# Patient Record
Sex: MALE | Race: WHITE
[De-identification: names, ages, dates, MRNs, and addresses within clinical notes are randomized per-mention and may not be internally consistent; named-entity substitution may affect disease eponyms.]

---

## 2020-11-17 ENCOUNTER — HOSPITAL ENCOUNTER (INPATIENT)
Dept: HOSPITAL 75 - ER FS | Age: 60
LOS: 1 days | Discharge: HOME | DRG: 65 | End: 2020-11-18
Attending: INTERNAL MEDICINE | Admitting: INTERNAL MEDICINE
Payer: SELF-PAY

## 2020-11-17 VITALS — SYSTOLIC BLOOD PRESSURE: 157 MMHG | DIASTOLIC BLOOD PRESSURE: 85 MMHG

## 2020-11-17 VITALS — BODY MASS INDEX: 28.6 KG/M2 | HEIGHT: 73.98 IN | WEIGHT: 222.89 LBS

## 2020-11-17 VITALS — DIASTOLIC BLOOD PRESSURE: 90 MMHG | SYSTOLIC BLOOD PRESSURE: 173 MMHG

## 2020-11-17 DIAGNOSIS — Z98.1: ICD-10-CM

## 2020-11-17 DIAGNOSIS — G82.20: ICD-10-CM

## 2020-11-17 DIAGNOSIS — Z23: ICD-10-CM

## 2020-11-17 DIAGNOSIS — G62.9: ICD-10-CM

## 2020-11-17 DIAGNOSIS — F17.210: ICD-10-CM

## 2020-11-17 DIAGNOSIS — I63.9: Primary | ICD-10-CM

## 2020-11-17 DIAGNOSIS — R47.01: ICD-10-CM

## 2020-11-17 LAB
ALBUMIN SERPL-MCNC: 4.6 GM/DL (ref 3.2–4.5)
ALP SERPL-CCNC: 81 U/L (ref 40–136)
ALT SERPL-CCNC: 27 U/L (ref 0–55)
BASOPHILS # BLD AUTO: 0 10^3/UL (ref 0–0.1)
BASOPHILS NFR BLD AUTO: 0 % (ref 0–10)
BILIRUB SERPL-MCNC: 0.7 MG/DL (ref 0.1–1)
BUN/CREAT SERPL: 11
CALCIUM SERPL-MCNC: 9.3 MG/DL (ref 8.5–10.1)
CHLORIDE SERPL-SCNC: 106 MMOL/L (ref 98–107)
CO2 SERPL-SCNC: 20 MMOL/L (ref 21–32)
CREAT SERPL-MCNC: 1.02 MG/DL (ref 0.6–1.3)
EOSINOPHIL # BLD AUTO: 0.1 10^3/UL (ref 0–0.3)
EOSINOPHIL NFR BLD AUTO: 1 % (ref 0–10)
GFR SERPLBLD BASED ON 1.73 SQ M-ARVRAT: > 60 ML/MIN
GLUCOSE SERPL-MCNC: 112 MG/DL (ref 70–105)
HCT VFR BLD CALC: 48 % (ref 40–54)
HGB BLD-MCNC: 16.9 G/DL (ref 13.3–17.7)
LYMPHOCYTES # BLD AUTO: 2.8 X 10^3 (ref 1–4)
LYMPHOCYTES NFR BLD AUTO: 25 % (ref 12–44)
MAGNESIUM SERPL-MCNC: 1.7 MG/DL (ref 1.6–2.4)
MANUAL DIFFERENTIAL PERFORMED BLD QL: NO
MCH RBC QN AUTO: 32 PG (ref 25–34)
MCHC RBC AUTO-ENTMCNC: 35 G/DL (ref 32–36)
MCV RBC AUTO: 92 FL (ref 80–99)
MONOCYTES # BLD AUTO: 1.1 X 10^3 (ref 0–1)
MONOCYTES NFR BLD AUTO: 10 % (ref 0–12)
NEUTROPHILS # BLD AUTO: 7.4 X 10^3 (ref 1.8–7.8)
NEUTROPHILS NFR BLD AUTO: 65 % (ref 42–75)
PLATELET # BLD: 160 10^3/UL (ref 130–400)
PMV BLD AUTO: 10.8 FL (ref 7.4–10.4)
POTASSIUM SERPL-SCNC: 3.8 MMOL/L (ref 3.6–5)
PROT SERPL-MCNC: 7.4 GM/DL (ref 6.4–8.2)
SODIUM SERPL-SCNC: 138 MMOL/L (ref 135–145)
WBC # BLD AUTO: 11.5 10^3/UL (ref 4.3–11)

## 2020-11-17 PROCEDURE — 72125 CT NECK SPINE W/O DYE: CPT

## 2020-11-17 PROCEDURE — 84484 ASSAY OF TROPONIN QUANT: CPT

## 2020-11-17 PROCEDURE — 80053 COMPREHEN METABOLIC PANEL: CPT

## 2020-11-17 PROCEDURE — 36415 COLL VENOUS BLD VENIPUNCTURE: CPT

## 2020-11-17 PROCEDURE — 72131 CT LUMBAR SPINE W/O DYE: CPT

## 2020-11-17 PROCEDURE — 90686 IIV4 VACC NO PRSV 0.5 ML IM: CPT

## 2020-11-17 PROCEDURE — 70450 CT HEAD/BRAIN W/O DYE: CPT

## 2020-11-17 PROCEDURE — 85379 FIBRIN DEGRADATION QUANT: CPT

## 2020-11-17 PROCEDURE — 83735 ASSAY OF MAGNESIUM: CPT

## 2020-11-17 PROCEDURE — 72128 CT CHEST SPINE W/O DYE: CPT

## 2020-11-17 PROCEDURE — 84443 ASSAY THYROID STIM HORMONE: CPT

## 2020-11-17 PROCEDURE — 70551 MRI BRAIN STEM W/O DYE: CPT

## 2020-11-17 PROCEDURE — 93306 TTE W/DOPPLER COMPLETE: CPT

## 2020-11-17 PROCEDURE — 93880 EXTRACRANIAL BILAT STUDY: CPT

## 2020-11-17 PROCEDURE — 85025 COMPLETE CBC W/AUTO DIFF WBC: CPT

## 2020-11-17 PROCEDURE — 80061 LIPID PANEL: CPT

## 2020-11-17 NOTE — ED GENERAL
General


Chief Complaint:  Neurological Problems


Stated Complaint:  ASHA LEG/LT ARM NUMBNESS; /100


Source of Information:  Patient





History of Present Illness


Date Seen by Provider:  Nov 17, 2020


Time Seen by Provider:  13:45


Initial Comments


Patient is a 52-year-old male who presents with multiple medical complaints. He 

complaints of bilateral extremity paresthesias starting late last night. Patient

was sitting down at time paresthesias began. He does have a history of 

neuropathy of bilateral lower extremities. He denies lower extremity weakness or

loss of sensation. Patient also reports intermittent left-sided chest pain with 

radiculopathy to left arm. Reports paresthesias without motor weakness to left 

upper extremity. Denies shortness breath, nausea vomiting, palpitations, sweats.

No classic anginal symptoms. No history of CAD, PE, dissection or valvular heart

disease. Patient does have history of cervical spine discectomy with fusion. 

Denies neck pain. No headache, blurred vision, abdominal pain, vomiting 

diarrhea, no


Timing/Duration:  12-24 Hours


Severity:  Mild


Modifying Factors:  improves with Other


Associated Systoms:  Denies Symptoms





Allergies and Home Medications


Allergies


Coded Allergies:  


     No Known Drug Allergies (Unverified , 11/17/20)





Patient Home Medication List


Home Medication List Reviewed:  Yes





Review of Systems


Review of Systems


Constitutional:  see HPI


EENTM:  see HPI


Respiratory:  see HPI


Cardiovascular:  see HPI


Gastrointestinal:  see HPI


Genitourinary:  see HPI


Musculoskeletal:  see HPI


Skin:  see HPI


Psychiatric/Neurological:  See HPI


Hematologic/Lymphatic:  See HPI


Immunological/Allergic:  see HPI





All Other Systems Reviewed


Negative Unless Noted:  Yes





Past Medical-Social-Family Hx


Past Med/Social Hx:  Reviewed Nursing Past Med/Soc Hx


Patient Social History


Recent Foreign Travel:  No


Contact w/Someone Who Travel:  No





Physical Exam


Vital Signs





Vital Signs - First Documented








 11/17/20





 13:35


 


Temp 36.2


 


Pulse 116


 


Resp 16


 


B/P (MAP) 173/105 (127)


 


Pulse Ox 97


 


O2 Delivery Room Air





Capillary Refill :


Height, Weight, BMI


Height: '"


Weight: lbs. oz. kg;  BMI


Method:


General Appearance:  Anxious


Eyes:  Bilateral Eye Normal Inspection, Bilateral Eye PERRL, Bilateral Eye EOMI


HEENT:  PERRL/EOMI, Pharynx Normal


Neck:  Full Range of Motion, Normal Inspection, Non Tender, Supple


Respiratory:  Chest Non Tender, Lungs Clear


Cardiovascular:  Regular Rate, Rhythm, No Edema, No Murmur


Gastrointestinal:  Normal Bowel Sounds, Non Tender, Soft


Back:  Normal Inspection, No CVA Tenderness


Extremity:  Normal Capillary Refill, Normal Inspection


Neurologic/Psychiatric:  Alert


Skin:  Normal Color, Warm/Dry


Lymphatic:  No Adenopathy





Focused Exam


Sepsis Stage:  Ruled Out





Progress/Results/Core Measures


Suspected Sepsis


SIRS


Temperature: 


Pulse:  


Respiratory Rate: 


 


Laboratory Tests


11/17/20 14:10: White Blood Count 11.5H


Blood Pressure  / 


Mean: 


 











Laboratory Tests


11/17/20 14:10: 


Creatinine 1.02, Platelet Count 160, Total Bilirubin 0.7





Results/Orders


Lab Results





Laboratory Tests








Test


 11/17/20


14:10 Range/Units


 


 


White Blood Count


 11.5 H


 4.3-11.0


10^3/uL


 


Red Blood Count


 5.24 


 4.35-5.85


10^6/uL


 


Hemoglobin 16.9  13.3-17.7  G/DL


 


Hematocrit 48  40-54  %


 


Mean Corpuscular Volume 92  80-99  FL


 


Mean Corpuscular Hemoglobin 32  25-34  PG


 


Mean Corpuscular Hemoglobin


Concent 35 


 32-36  G/DL





 


Red Cell Distribution Width 13.2  10.0-14.5  %


 


Platelet Count


 160 


 130-400


10^3/uL


 


Mean Platelet Volume 10.8 H 7.4-10.4  FL


 


Immature Granulocyte % (Auto) 0   %


 


Neutrophils (%) (Auto) 65  42-75  %


 


Lymphocytes (%) (Auto) 25  12-44  %


 


Monocytes (%) (Auto) 10  0-12  %


 


Eosinophils (%) (Auto) 1  0-10  %


 


Basophils (%) (Auto) 0  0-10  %


 


Neutrophils # (Auto) 7.4  1.8-7.8  X 10^3


 


Lymphocytes # (Auto) 2.8  1.0-4.0  X 10^3


 


Monocytes # (Auto) 1.1 H 0.0-1.0  X 10^3


 


Eosinophils # (Auto)


 0.1 


 0.0-0.3


10^3/uL


 


Basophils # (Auto)


 0.0 


 0.0-0.1


10^3/uL


 


Immature Granulocyte # (Auto)


 0.1 


 0.0-0.1


10^3/uL


 


D-Dimer


 0.47 


 0.00-0.49


UG/ML


 


Sodium Level 138  135-145  MMOL/L


 


Potassium Level 3.8  3.6-5.0  MMOL/L


 


Chloride Level 106    MMOL/L


 


Carbon Dioxide Level 20 L 21-32  MMOL/L


 


Anion Gap 12  5-14  MMOL/L


 


Blood Urea Nitrogen 11  7-18  MG/DL


 


Creatinine


 1.02 


 0.60-1.30


MG/DL


 


Estimat Glomerular Filtration


Rate > 60 


  





 


BUN/Creatinine Ratio 11   


 


Glucose Level 112 H   MG/DL


 


Calcium Level 9.3  8.5-10.1  MG/DL


 


Corrected Calcium   8.5-10.1  MG/DL


 


Magnesium Level 1.7  1.6-2.4  MG/DL


 


Total Bilirubin 0.7  0.1-1.0  MG/DL


 


Aspartate Amino Transf


(AST/SGOT) 24 


 5-34  U/L





 


Alanine Aminotransferase


(ALT/SGPT) 27 


 0-55  U/L





 


Alkaline Phosphatase 81    U/L


 


Troponin I < 0.30  <0.30  NG/ML


 


Total Protein 7.4  6.4-8.2  GM/DL


 


Albumin 4.6 H 3.2-4.5  GM/DL








My Orders





Orders - HAYDEE MATUTE DO


Fibrin Degradation Products (11/17/20 14:03)


Cbc With Automated Diff (11/17/20 14:16)


Comprehensive Metabolic Panel (11/17/20 14:16)


Magnesium (11/17/20 14:16)


Ct Head Wo (11/17/20 14:16)


Thyroid Stimulating Hormone (11/17/20 14:16)


Troponin I Fs (11/17/20 14:16)


Ekg Tracing (11/17/20 14:16)


Ct Cerv/Thoracic/Lumbar Wo (11/17/20 16:03)


Aspirin Chewable Tablet (Baby Aspirin Ch (11/17/20 17:30)





Vital Signs/I&O











 11/17/20





 13:35


 


Temp 36.2


 


Pulse 116


 


Resp 16


 


B/P (MAP) 173/105 (127)


 


Pulse Ox 97


 


O2 Delivery Room Air





Capillary Refill :





Departure


Communication (Admissions)


Patient with paresthesias involving lower extremities and left arm. No motor 

weakness. Currently denies chest pain. EKG and troponin are negative.  CT shows 

subacute infarct involving the right basal ganglia. Will admit to the hospital 

service.





Impression





   Primary Impression:  


   Acute ischemic stroke


Disposition:  09 ADMITTED AS INPATIENT


Condition:  Stable





Admissions


Decision to Admit Reason:  Admit from ER (General)





Transfer


Transfer Reason:  Exceeds level of care


Time Spoke to Accepting Phy:  17:15


Transfer Time:  17:32 (Dr. Moody)


Method of Transfer:  EMS





Departure-Patient Inst.


Referrals:  


JOYCELYN DAY MD (PCP/Family)


Primary Care Physician











HAYDEE MATUTE DO                   Nov 17, 2020 14:10

## 2020-11-17 NOTE — DIAGNOSTIC IMAGING REPORT
PROCEDURE: 

CT head without contrast.



TECHNIQUE: 

Multiple contiguous axial images were obtained through the brain

without the use of intravenous contrast. Auto Exposure Controls

were utilized during the CT exam to meet ALARA standards for

radiation dose reduction. 



INDICATION:  

Altered mental status.



COMPARISON: 

None.



FINDINGS: 

No intracranial hemorrhage, mass effect, hydrocephalus, or

extra-axial fluid collections. Mild generalized cerebral and

cerebellar parenchymal volume loss. Mild leukoaraiosis.

Low-attenuation changes in the right basal ganglia are age

indeterminate. Presumed chronic lacunar infarct in the left basal

ganglia. Osseous structures are intact. Visualized paranasal

sinuses and mastoids are clear.



IMPRESSION: 

1. Low-attenuation ill-defined region in the right basal ganglia

is age indeterminate. Although this may represent a chronic

lacunar infarct, a more acute process cannot be excluded. There

is a more chronic appearing infarct versus prominent perivascular

space in the left basal ganglia. Recommend further evaluation

with MRI.



2. Mild generalized cerebral and cerebellar parenchymal volume

loss. Mild leukoaraiosis.



Dictated by: 



  Dictated on workstation # [id number]

## 2020-11-17 NOTE — NUR
NATHANIEL COONEY admitted to room 404-1, with an admitting diagnosis of CVA, on 11/17/20 
from [facility] via EMS, accompanied by STAFF.NATHANIEL COONEY introduced to surroundings, call 
light, bed controls, phone, TV, temperature control, lights, meal times, smoking policy, 
visitor policy, side rail policy, bathrooms and showers.  Patient Rights given to patient in 
the handbook. NATHANIEL COONEY verbalizes understanding that [facility] is not responsible 
for the loss or damage to any personal effects or valuables that are kept in the patients 
posession during their hospitalization.  The following

## 2020-11-17 NOTE — DIAGNOSTIC IMAGING REPORT
INDICATION: Back pain.



EXAMINATION: Axial imaging through the cervical, thoracic and

lumbar spine was performed without contrast. Sagittal and coronal

reformations were also performed.



FINDINGS:



CT cervical:



Postoperative changes of ACDF with anterior plate and screws

transfixing C6-C7 levels noted. Hardware appears to be intact.

There is degenerative disease at C5-C6 level with disc space

narrowing and marginal spurring. No fractures are seen.

Prevertebral tissues are within normal limits. Odontoid is

intact.



IMPRESSION: Postop and degenerative changes. No acute bony

abnormality is detected.



CT thoracic spine:



Curvature and alignment of the thoracic spine is normal.

Vertebral body heights are maintained. There is multilevel

degenerative disc disease with variable disc space narrowing and

marginal spurring. Paraspinous tissues are unremarkable.



IMPRESSION: Thoracic spondylosis. No acute bony abnormality is

detected.



CT lumbar spine:



Curvature and alignment of the lumbar spine is normal. Vertebral

body heights are maintained. No acute compression fracture is

seen. There is some mild degenerative changes at all levels of

the lumbar spine with marginal spurring. There is significant

facet arthropathy at the L5-S1 level, particularly on the right.

The paraspinous tissues are unremarkable.



IMPRESSION: Lumbar spondylosis. No acute bony abnormality is

detected.



Dictated by: 



  Dictated on workstation # [id number]

## 2020-11-18 VITALS — DIASTOLIC BLOOD PRESSURE: 97 MMHG | SYSTOLIC BLOOD PRESSURE: 154 MMHG

## 2020-11-18 VITALS — DIASTOLIC BLOOD PRESSURE: 87 MMHG | SYSTOLIC BLOOD PRESSURE: 167 MMHG

## 2020-11-18 VITALS — DIASTOLIC BLOOD PRESSURE: 87 MMHG | SYSTOLIC BLOOD PRESSURE: 153 MMHG

## 2020-11-18 VITALS — DIASTOLIC BLOOD PRESSURE: 93 MMHG | SYSTOLIC BLOOD PRESSURE: 179 MMHG

## 2020-11-18 LAB
ALBUMIN SERPL-MCNC: 4.1 GM/DL (ref 3.2–4.5)
ALP SERPL-CCNC: 65 U/L (ref 40–136)
ALT SERPL-CCNC: 26 U/L (ref 0–55)
BASOPHILS # BLD AUTO: 0 10^3/UL (ref 0–0.1)
BASOPHILS NFR BLD AUTO: 0 % (ref 0–10)
BILIRUB SERPL-MCNC: 1 MG/DL (ref 0.1–1)
BUN/CREAT SERPL: 11
CALCIUM SERPL-MCNC: 8.8 MG/DL (ref 8.5–10.1)
CHLORIDE SERPL-SCNC: 107 MMOL/L (ref 98–107)
CHOLEST SERPL-MCNC: 154 MG/DL (ref ?–200)
CO2 SERPL-SCNC: 20 MMOL/L (ref 21–32)
CREAT SERPL-MCNC: 1.01 MG/DL (ref 0.6–1.3)
EOSINOPHIL # BLD AUTO: 0.2 10^3/UL (ref 0–0.3)
EOSINOPHIL NFR BLD AUTO: 2 % (ref 0–10)
GFR SERPLBLD BASED ON 1.73 SQ M-ARVRAT: > 60 ML/MIN
GLUCOSE SERPL-MCNC: 99 MG/DL (ref 70–105)
HCT VFR BLD CALC: 48 % (ref 40–54)
HDLC SERPL-MCNC: 32 MG/DL (ref 40–60)
HGB BLD-MCNC: 16.6 G/DL (ref 13.3–17.7)
LYMPHOCYTES # BLD AUTO: 3.5 10^3/UL (ref 1–4)
LYMPHOCYTES NFR BLD AUTO: 33 % (ref 12–44)
MANUAL DIFFERENTIAL PERFORMED BLD QL: NO
MCH RBC QN AUTO: 33 PG (ref 25–34)
MCHC RBC AUTO-ENTMCNC: 35 G/DL (ref 32–36)
MCV RBC AUTO: 95 FL (ref 80–99)
MONOCYTES # BLD AUTO: 1.2 10^3/UL (ref 0–1)
MONOCYTES NFR BLD AUTO: 11 % (ref 0–12)
NEUTROPHILS # BLD AUTO: 5.8 10^3/UL (ref 1.8–7.8)
NEUTROPHILS NFR BLD AUTO: 54 % (ref 42–75)
PLATELET # BLD: 152 10^3/UL (ref 130–400)
PMV BLD AUTO: 10.9 FL (ref 9–12.2)
POTASSIUM SERPL-SCNC: 4.1 MMOL/L (ref 3.6–5)
PROT SERPL-MCNC: 7.1 GM/DL (ref 6.4–8.2)
SODIUM SERPL-SCNC: 137 MMOL/L (ref 135–145)
TRIGL SERPL-MCNC: 69 MG/DL (ref ?–150)
VLDLC SERPL CALC-MCNC: 14 MG/DL (ref 5–40)
WBC # BLD AUTO: 10.8 10^3/UL (ref 4.3–11)

## 2020-11-18 RX ADMIN — Medication SCH ML: at 05:26

## 2020-11-18 RX ADMIN — Medication SCH ML: at 14:38

## 2020-11-18 NOTE — PHYSICAL THERAPY EVALUATION
PT Evaluation-General


Medical Diagnosis


Admission Date


Nov 17, 2020 at 20:55


Medical Diagnosis:  bilateral leg and left arm numbness


Onset Date:  Nov 17, 2020





Therapy Diagnosis


Therapy Diagnosis:  normal functional mobility





Precautions


Precautions/Isolations:  Standard Precautions





Referral


Physician:  Fransisco


Reason for Referral:  Evaluation/Treatment





Medical History


Pertinent Medical History:  Neuropathy


Reviewed History:  Yes





Social History


Home:  Single Level


Current Living Status:  Alone


Entry Into Home:  Ramp





Prior


Prior Level of Function


SCALE: Activities may be completed with or without assistive devices.





6-Indepedent-patient completes the activity by him/herself with no assistance 

from a helper.


5-Set-up or Clean-up Assistance-helper sets up or cleans up; patient completes 

activity. [place] assists only prior to or  


    following the activity.


4-Supervision or Touching Assistance-helper provides verbal cues and/or 

touching/steadying and/or contact guard assistance as patient completes 

activity. Assistance may be provided   


    throughout the activity or intermittently.


3-Partial/Moderate Assistance-helper does LESS THAN HALF the effort. [place] 

lifts, holds or supports trunk or limbs, but provides less than half the effort.


2-Substantial/Maximal Assistance-helper does MORE THAN HALF the effort. [place] 

lifts or holds trunk or limbs and provides more than half the effort.


3-Hzznfirmi-qcqkyt does ALL the effort. Patient does none of the effort to 

complete the activity. Or, the assistance of 2 or more helpers is required for 

the patient to complete the  


    activity.


If activity was not attempted, code reason:


7-Patient Refused.


9-Not Applicable-not attempted and the patient did not perform the activity 

before the current illness, exacerbation or injury.


10-Not Attempted due to Environmental Limitations-(lack of equipment, weather 

restraints, etc.).


88-Not Attempted due to Medical Conditions or Safety Concerns.


Bed Mobility:  6


Transfers (B,C,W/C):  6


Gait:  6


Stairs:  6


Indoor Mobility (Ambulation):  Independent


Stairs:  Independent





PT Evaluation-Current


Subjective


Patient in bed pre tx, agrees to PT, has no complaints of pain





Pt/Family Goals


to be independent at home





Objective


Patient Orientation:  Person, Place, Situation





ROM/Strength


ROM Lower Extremities


WNL


Strength Lower Extremities


5/5 gross BLE





Neuromuscular


(Tone, Coordination, Reflexes)


Patient has normal peripheral vision and tracking, no obvious facial asymmetry.





Sensory


Vision:  Functional


Hearing:  Functional


Sensation Right Lower Extremit:  Intact


Sensation Left Lower Extremity:  Intact





Transfers


Roll Left to Right (QC):  6


Sit to Lying (QC):  6


Lying to Sitting/Side of Bed(Q:  6


Sit to Stand (QC):  6


Chair/Bed-to-Chair Xfer(QC):  6


Patient states he has been ambulating to the restroom on his own without 

difficulty.   Patient has no complaints of dizziness or lightheadedness when 

standing





Gait


Does the Patient Walk?:  Yes


Mode of Locomotion:  Walk


Anticipated Mode of Locomotion:  Walk


Walk 10 feet (QC):  6


Walk 50 ft with 2 Turns(QC):  6


Walk 150 ft (QC):  6


Distance:  150'


Gait Assistive Device:  None


Comments/Gait Description


normal, independent ambulation without an assistive device





Balance


Sitting Static:  Normal


Sitting Dynamic:  Normal


Standing Static:  Normal


 Standing Dynamic:  Normal





Assessment/Needs


Patient has normal functional mobility


Rehab Potential:  Good





PT Plan


Problem List


Problem List:  Other





Treatment/Plan


Treatment Plan:  Discontinue PT


Treatment Plan:  Other


Treatment Duration:  Nov 18, 2020


Frequency:  


Patient and/or Family Agrees t:  Yes





Discharge Recommendations


Plan


DC


Therapy Discharge Recommendati:  Home & Family





Time/GCodes


Time In:  0836


Time Out:  0846


Total Billed Treatment Time:  10


Total Billed Treatment


1 visit


EVL 10'











KRTEK,MISBAH PT                Nov 18, 2020 08:51

## 2020-11-18 NOTE — CONSULTATION-CARDIOLOGY
HPI-Cardiology


Cardiology Consultation:


Date of Consultation


20


Date of Admission





Attending Physician


Adeline Lagos DO


Admitting Physician


Suresh Montana MD


Consulting Physician


CHARLEY LOVELACE MD





HPI:


Time Seen by a Provider:  12:00





Review of Systems-Cardiology


All Other Systems Reviewed


Negative Unless Noted:  Yes





PMH-Social-Family Hx


Patient Social History


Alcohol Use:  Denies Use


Recreational Drug Use:  No


Smoking Status:  Current Everyday Smoker


Type Used:  Cigarettes


2nd Hand Smoke Exposure:  No


Recent Foreign Travel:  No


Recent Infectious Disease Expo:  No


Hospitalization with Isolation:  Denies





Past Medical History


PMH


As described under Assessment.





Allergies and Home Medications


Allergies


Coded Allergies:  


     No Known Drug Allergies (Unverified , 20)





Home Medications


Acetaminophen 325 Mg Tablet, 650 MG PO Q6H PRN for PAIN-MILD (1-4), (Reported)


   TAKES 2 (325MG) TABLETS 


Aspirin 81 Mg Tablet.dr, 81 MG PO DAILY


   Prescribed by: ADELINE LAGOS on 20 1101


Atorvastatin Calcium 80 Mg Tablet, 80 MG PO HS


   Prescribed by: ADELINE LAGOS on 20 1101


Famotidine 10 Mg Tablet, 10 MG PO DAILY PRN for HEARTBURN, (Reported)


Lisinopril 10 Mg Tablet, 10 MG PO DAILY


   Prescribed by: ADELINE LAGOS on 20 1101





Physical Exam-Cardiology


Physical Exam


Vital Signs/I&O











 20





 07:36 07:44 12:03 12:45


 


Temp 36.3  36.4 


 


Pulse 96 83 84 102


 


Resp 20  20 


 


B/P (MAP) 154/97 (116)  179/93 (121) 


 


Pulse Ox 98  98 


 


O2 Delivery Room Air  Room Air 


 


    





 20   





 15:43   


 


Temp 36.7   


 


Pulse 82   


 


Resp 22   


 


B/P (MAP) 153/87 (109)   


 


Pulse Ox 95   


 


O2 Delivery Room Air   





Capillary Refill : Less Than 3 Seconds





Data Review


Labs


Laboratory Tests


20 06:11: 


White Blood Count 10.8, Red Blood Count 5.08, Hemoglobin 16.6, Hematocrit 48, 

Mean Corpuscular Volume 95, Mean Corpuscular Hemoglobin 33, Mean Corpuscular 

Hemoglobin Concent 35, Red Cell Distribution Width 13.0, Platelet Count 152, 

Mean Platelet Volume 10.9, Immature Granulocyte % (Auto) 0, Neutrophils (%) 

(Auto) 54, Lymphocytes (%) (Auto) 33, Monocytes (%) (Auto) 11, Eosinophils (%) 

(Auto) 2, Basophils (%) (Auto) 0, Neutrophils # (Auto) 5.8, Lymphocytes # (Auto)

3.5, Monocytes # (Auto) 1.2H, Eosinophils # (Auto) 0.2, Basophils # (Auto) 0.0, 

Immature Granulocyte # (Auto) 0.0, Sodium Level 137, Potassium Level 4.1, 

Chloride Level 107, Carbon Dioxide Level 20L, Anion Gap 10, Blood Urea Nitrogen 

11, Creatinine 1.01, Estimat Glomerular Filtration Rate > 60, BUN/Creatinine 

Ratio 11, Glucose Level 99, Calcium Level 8.8, Corrected Calcium 8.7, Total 

Bilirubin 1.0, Aspartate Amino Transf (AST/SGOT) 26, Alanine Aminotransferase 

(ALT/SGPT) 26, Alkaline Phosphatase 65, Total Protein 7.1, Albumin 4.1, 

Triglycerides Level 69, Cholesterol Level 154, LDL Cholesterol Direct 119, VLDL 

Cholesterol 14, HDL Cholesterol 32L








A/P-Cardiology


Plan








Thank you for your consultation. Please call me if you have any questions.








RAMÓN Lovelace MD, FACP, FACC, FSCAI, FHRS, CCDS


Interventional Cardiology


Cardiac Electrophysiology


Vascular Medicine and Endovascular Interventions





Clinical Quality Measures


DVT/VTE Risk/Contraindication:


Risk Factor Score Per Nursin


RFS Level Per Nursing on Admit:  2=Moderate











CHARLEY LOVELACE MD             2020 17:16

## 2020-11-18 NOTE — SHORT STAY SUMMARY-HOSPITALIST
MARIUSZ BAUER MED STUDENT 20 1210:


History of Present Illness


HPI/Chief Complaint


Mr John was admitted yesterday through ED due to some new neurological 

deficits that he experienced. He had bilateral extremity paresethia and has a 

history of neuropathy. He did not seem to have any motor weakness. No chest pain

and a negative ECG. He received a CT of his head that showed an acute lacunar 

infarct in the R thalamus. He states today that he is feeling very well. His 

symptoms seem to have completely resolved and he does not have any complaints. MARYJANE carlos does not have any trouble eating, using the restroom, or walking around. He 

does have a history of smoking.


Source:  patient


Date Seen


20


Time Seen by a Provider:  09:40


Attending Physician


Adeline Lagos Pankaj K MD


Referring Physician





Date of Admission


2020 at 20:55





Home Medications & Allergies


Home Medications


Reviewed patient Home Medication Reconciliation performed by pharmacy medication

reconciliations technician and/or nursing.


Patients Allergies have been reviewed.





Allergies





Allergies


Coded Allergies


  No Known Drug Allergies ([id number]/17/20)








Past Medical-Social-Family Hx


Past Med/Social Hx:  Reviewed Nursing Past Med/Soc Hx


Patient Social History


Alcohol Use:  Denies Use


Recreational Drug Use:  No


Smoking Status:  Current Everyday Smoker


Type Used:  Cigarettes


2nd Hand Smoke Exposure:  No


Recent Foreign Travel:  No


Contact w/other who traveled:  No


Recent Hopitalizations:  No


Recent Infectious Disease Expo:  No





Seasonal Allergies


Seasonal Allergies:  No





Review of Systems


Constitutional:  no symptoms reported


EENTM:  no symptoms reported


Respiratory:  no symptoms reported


Cardiovascular:  no symptoms reported


Gastrointestinal:  no symptoms reported


Genitourinary:  no symptoms reported


Musculoskeletal:  no symptoms reported


Skin:  no symptoms reported


Psychiatric/Neurological:  No Symptoms Reported





Physical Exam


Physical Exam


Vital Signs





Vital Signs - First Documented








 20





 13:35


 


Temp 36.2


 


Pulse 116


 


Resp 16


 


B/P (MAP) 173/105 (127)


 


Pulse Ox 97


 


O2 Delivery Room Air





Capillary Refill : Less Than 3 Seconds


Height, Weight, BMI


Height: '"


Weight: lbs. oz. kg; 28.63 BMI


Method:


General Appearance:  No Apparent Distress, WD/WN, Anxious


Eyes:  Bilateral Eye Normal Inspection, Bilateral Eye PERRL, Bilateral Eye EOMI


HEENT:  PERRL/EOMI, TMs Normal, Pharynx Normal


Neck:  Full Range of Motion, Normal Inspection, Non Tender, Supple


Respiratory:  Chest Non Tender, Lungs Clear, Normal Breath Sounds, No Accessory 

Muscle Use, No Respiratory Distress


Cardiovascular:  Regular Rate, Rhythm, No Edema, No Gallop, No Murmur, Normal 

Peripheral Pulses


Gastrointestinal:  Normal Bowel Sounds, Non Tender, Soft


Rectal:  Deferred


Back:  Normal Inspection, No CVA Tenderness


Extremity:  Normal Capillary Refill, Normal Inspection, No Pedal Edema


Neurologic/Psychiatric:  Alert, Oriented x3, No Motor/Sensory Deficits, Normal 

Mood/Affect, CNs II-XII Norm as Tested


Skin:  Normal Color, Warm/Dry


Lymphatic:  No Adenopathy





Results


Results/Procedures


Labs


Laboratory Tests


20 14:10








20 06:11








Patient resulted labs reviewed.





Short Stay Diagnosis


Discharge Diagnosis-Short Stay


Admission Diagnosis


Suspected Stroke


Final Discharge Diagnosis


Acute lacunar infarct R thalamus





Conclusion


Plan


Sent patient home with Rx for Lisinopril


Prescribe Lipitor 


Encourage smoking cessation


Consult cardiology


Monitor for any change or worsening of neurological status


D/C if cardiology clears





Diagnosis/Problems


Diagnosis/Problems





(1) Smoker


(2) Hypertension


(3) Acute ischemic stroke


Status:  Acute





Clinical Quality Measures


DVT/VTE Risk/Contraindication:


Risk Factor Score Per Nursin


RFS Level Per Nursing on Admit:  2=Moderate





Supervisory-Addendum Brief


Verification & Attestation


Participated in pt care:  history, physical


Personally performed:  exam, history


Care discussed with:  other


Procedures:  n/a








LAGOSOSWALDO GALLEGOSALY WILSON 20 0555:


History of Present Illness


HPI/Chief Complaint


CC: Stroke like symptoms


HPI: This is a 60yoWM clinic pt of Dr. Montana who presents with concerning 

stroke symptoms to the ER. He went to the [place] ER with complaints of B/L 

weakness and unable to speak for approximately 24hrs so he was not a TPA 

candidate. I went ahead and ordered and echocardiogram and carotid ultrasound 

which were normal. Dr. Lovelace saw him in consultation. MRI confirmed stroke and 

Lisinopril 10mg daily along with baby aspirin and statin therapy and a 30 days 

monitor was arranged with close follow up with PCP. Smoking cessation was 

aggressively counseled.








Past Medical-Social-Family Hx


Past Med/Social Hx:  Reviewed Nursing Past Med/Soc Hx, Reviewed and Corrections 

made


Patient Social History


Marrital Status:  single


Employed/Student:  unemployed





Review of Systems


Constitutional:  see HPI, malaise, weakness





Physical Exam


Physical Exam


General Appearance:  No Apparent Distress, WD/WN


Eyes:  Bilateral Eye Normal Inspection, Bilateral Eye PERRL


HEENT:  PERRL/EOMI, TMs Normal, Normal ENT Inspection, Pharynx Normal


Neck:  Full Range of Motion, Normal Inspection, Non Tender, Supple, Carotid 

Bruit


Respiratory:  Chest Non Tender, Lungs Clear, Normal Breath Sounds, No Accessory 

Muscle Use, No Respiratory Distress


Cardiovascular:  Regular Rate, Rhythm, No Edema, No Gallop, No JVD, No Murmur, 

Normal Peripheral Pulses


Gastrointestinal:  Normal Bowel Sounds, No Organomegaly, No Pulsatile Mass, Non 

Tender, Soft


Back:  Normal Inspection, No CVA Tenderness, No Vertebral Tenderness


Extremity:  Normal Capillary Refill, Normal Inspection, Normal Range of Motion, 

Non Tender, No Calf Tenderness, No Pedal Edema


Neurologic/Psychiatric:  Alert, Oriented x3, No Motor/Sensory Deficits, Normal 

Mood/Affect


Skin:  Normal Color, Warm/Dry


Lymphatic:  No Adenopathy





Short Stay Diagnosis


Discharge Diagnosis-Short Stay


Admission Diagnosis


CVA


HTN


Smoker


Final Discharge Diagnosis


CVA


HTN


Smoker





Conclusion


Plan


DC home


30 day monitor


Statin


ASA





Diagnosis/Problems


Diagnosis/Problems





(1) Acute ischemic stroke


Status:  Acute


(2) Smoker


(3) Hypertension





Supervisory-Addendum Brief


Verification & Attestation


Participated in pt care:  history, MDM, physical


Personally performed:  exam, history, MDM, supervision of care


Care discussed with:  Medical Student


Procedures:  n/a


Results interpretation:  Verified all documentation


Verification and Attestation of Medical Student E/M Service





A medical student performed and documented this service in my presence. I 

reviewed and verified all information documented by the medical student and made

modifications to such information, when appropriate. I personally performed the 

physical exam and medical decision making. 





 Adeline Lagos, 2020,05:55


  











MARIUSZ BAUER MED STUDENT      2020 12:10


ADELINE LAGOS DO                2020 05:55

## 2020-11-18 NOTE — DIAGNOSTIC IMAGING REPORT
PROCEDURE: MR imaging of the brain without contrast.



TECHNIQUE: Multiplanar, multisequence MR imaging of the brain was

performed without contrast.



INDICATION:  Left arm weakness and bilateral leg weakness.



No prior studies are available for comparison.



Diffusion weighted images demonstrate a small area of diffusion

restriction in the right thalamus consistent with an

acute/subacute infarct. No other areas of diffusion restriction

are identified. Mild periventricular white matter changes are

noted. There is no midline shift. No acute intra-axial or

extra-axial hemorrhage is detected. Corpus callosum is

unremarkable. Sella and parasellar structures are unremarkable.



IMPRESSION:

1.  Acute lacunar infarct right thalamus. No acute intracranial

hemorrhage detected.

2. White matter changes, likely owing to chronic microvascular

ischemia.



Dictated by: 



  Dictated on workstation # [id number]

## 2020-11-18 NOTE — DIAGNOSTIC IMAGING REPORT
PROCEDURE: US carotid duplex, bilateral.



TECHNIQUE: Multiple real-time grayscale images were obtained over

the carotid arteries in various projections, bilaterally.

Additional spectral analysis and color Doppler duplex images were

also obtained.



INDICATION: CVA.



FINDINGS: There is mild plaquing in the distal common carotid

arteries and carotid bifurcations extending into the proximal

internal and external carotid arteries. Velocities are normal

bilaterally. No velocity elevation or stenosis is seen. Both

vertebral arteries demonstrate antegrade flow.



IMPRESSION: Mild bilateral carotid plaques. There is no evidence

of a hemodynamically significant stenosis.



Parameters based on the consensus panel Gray-Scale and Doppler

ultrasound criteria published 

November 2003, Radiology, Volume 229. 



DOPPLER (peak systolic velocity M/S 

    

                Right    Left



CCA              .96     .94



ICA Proximal .63     .60



ICA Mid         .59     .74

 

ICA Distal      .50     .86



RATIO           .6     .92



ECA               1.0     1.1



VERT             .60     .61



Dictated by: 



  Dictated on workstation # [id number]

## 2020-11-18 NOTE — NUR
PALOMO/DARLIN visited with the patient for discharge planning. 



Plan: Patient will return home at time of discharge. The patient is following up with Dr. Wiley's office for a heart monitor. 



Home: Patient lives in Public housing in [place]. He lives alone with his cat. 



Equipment: None. 



Home Health// Caregiver: None. 



Resources: The patient does not receive any financial assistance from the state or 
government. PALOMO/DARLIN discussed applying for Medicaid. PALOMO/DARLIN brought the patient an application 
for Medicaid and the application for food stamps. PALOMO/DARLIN uses [facility] for medications. He 
reports that he does not have any trouble affording them at this time. 



Supports: The patient has a niece and nephew that help support him when needed. The patient 
reports that he has a brother who sends him enough money to get by. His sister has recently 
moved into a nursing home. The patient reports that another support is his housing 
neighbors. He states they will bring him food when needed and are the one's who called EMS. 



No further needs right now.

## 2020-11-18 NOTE — NUR
I SPOKE WITH THE PATIENT TO COMPLETE THIS MED REC. PATIENT ISN'T TAKING ANY PRESCRIPTION 
MEDICATIONS AT THIS TIME.



OTC:

TYLENOL

FAMOTIDINE

## 2020-11-18 NOTE — OCC THERAPY PROGRESS NOTE
Therapy Progress Note


OT orders received and pt's chart reviewed. OT visited with pt who states he is 

not having any difficulty with ADLs or functional mobility at this time, he has 

been up around his room, and goes to the bathroom without difficulty. He is 

hoping to discharge home today. Pt's UE ROM/strength are within functional 

limits. Pt feels like he is at his PLOF and doesn't require OT services. Pt is 

currently at his PLOF, and independent with all ADLS, no skilled OT services are

indicated, d/c from OT.





1, visit


1319











OBED BLANDON OT          Nov 18, 2020 13:46

## 2020-11-19 ENCOUNTER — HOSPITAL ENCOUNTER (OUTPATIENT)
Dept: HOSPITAL 75 - CARD | Age: 60
LOS: 90 days | Discharge: HOME | End: 2021-02-17
Attending: INTERNAL MEDICINE
Payer: SELF-PAY

## 2020-11-19 DIAGNOSIS — I63.89: Primary | ICD-10-CM

## 2020-11-19 PROCEDURE — 93270 REMOTE 30 DAY ECG REV/REPORT: CPT

## 2021-04-05 ENCOUNTER — HOSPITAL ENCOUNTER (OUTPATIENT)
Dept: HOSPITAL 75 - CARD | Age: 61
End: 2021-04-05
Attending: PHYSICIAN ASSISTANT
Payer: COMMERCIAL

## 2021-04-05 VITALS — DIASTOLIC BLOOD PRESSURE: 85 MMHG | SYSTOLIC BLOOD PRESSURE: 206 MMHG

## 2021-04-05 VITALS — WEIGHT: 198.42 LBS | BODY MASS INDEX: 25.74 KG/M2 | HEIGHT: 73.62 IN

## 2021-04-05 DIAGNOSIS — R07.9: Primary | ICD-10-CM

## 2021-04-05 PROCEDURE — 78452 HT MUSCLE IMAGE SPECT MULT: CPT

## 2021-04-05 PROCEDURE — 93017 CV STRESS TEST TRACING ONLY: CPT

## 2021-04-05 NOTE — CARDIOLOGY STRESS TEST REPORT
Stress Test Report


Date of Procedure/Referring:


Date of Procedure:  Apr 5, 2021


Olamide Gomez


Admitting Physician


Suresh Montana MD





Indications:


cp





Baseline Heart Rate:


95





Baseline Blood Pressure:


Blood Pressure Systolic:  206


Blood Pressure Diastolic:  85


Baseline Vitals





Vital Signs








  Date Time  Temp Pulse Resp B/P (MAP) Pulse Ox O2 Delivery O2 Flow Rate FiO2


 


4/5/21 09:53  88  206/85 (125)    











Baseline EKG:


Baseline EKG:  NSR





Summary


After explaining the procedure to the patient, he  signed a consent and then 

brought to the stress nuclear laboratory.


Patient received 0.4 mg Lexiscan for stress test, ECG, heart rate and blood 

pressure were monitored continuously.  Resting and stress dose of radio tracer w

ere injected, imaging was acquired and reviewed in short axis, horizontal long 

axis and vertical long axis views.


TID:  0.97


SSS:  1


SDS:  1


EF:  64


1.  Patient tolerated Lexiscan well


2.  Diaphragmatic attenuation with typical male pattern no significant ischemia 

or infarction on SPECT images


3.  Normal left ventricular size, EF 64%











GABRIEL CASAREZ MD               Apr 5, 2021 19:56